# Patient Record
Sex: FEMALE | Race: WHITE
[De-identification: names, ages, dates, MRNs, and addresses within clinical notes are randomized per-mention and may not be internally consistent; named-entity substitution may affect disease eponyms.]

---

## 2017-01-09 NOTE — WOMENS IMAGING REPORT
EXAM DESCRIPTION:  RIGHT SCREENING MAMMO W/CAD



COMPLETED DATE/TIME:  1/9/2017 11:13 am



REASON FOR STUDY:  RIGHT SCREENING MAMMO Z12.31  ENCNTR SCREEN MAMMOGRAM FOR MALIGNANT NEOPLASM OF BR
E



COMPARISON:  Multiple since 2012



TECHNIQUE:  Standard craniocaudal and mediolateral oblique views of the right breast recorded using d
igital acquisition.

Patient is post left mastectomy in 2012



LIMITATIONS:  None.



FINDINGS:  BREAST: Right

Findings present which are benign by mammographic criteria. No suspicious masses, calcifications or a
rchitectural distortion.  Benign calcification in the right breast 12 o'clock position

Read with the assistance of CAD.

.[facility] - R2 Cenova Version 1.3

.[facility] - R2 Cenova Version 1.3

.[facility] - R2 Cenova Version 2.4

.[facility] - R2 Cenova Version 2.4

.[facility] - R2  Version 9.2

Benign mammographic findings may include one or more of the following:  Smooth masses, popcorn/rim/co
arse calcifications, asymmetries, post-procedure changes, and lesions with long-standing stability.



BREAST DENSITY:  b. There are scattered areas of fibroglandular density.



BIRAD:  2 BENIGN FINDING(S)



RECOMMENDATION:  RECOMMENDATION:  ROUTINE SCREENING.



COMMENT:  PATIENT NOTIFIED BY LETTER.

The American College of Radiology recommends an annual screening mammogram for women aged 40 years or
 over.  Each patient will receive a reminder prior to the anniversary date of her mammogram.

The American College of Radiology (ACR) has developed recommendations for screening MRI of the breast
s in certain patient populations, to be used in conjunction with mammography. Breast MRI surveillance
 may be appropriate for women with more than 20% lifetime risk of developing breast cancer as determi
tere by genetic testing, significant family history of the disease, or history of mantle radiation for
 Hodgkins Disease. ACR Practice Guidelines 2008.



TECHNICAL DOCUMENTATION:  FINDING NUMBER: (1)

ASSESSMENT: (1)

JOB ID:  [id number]

 2011 [employer]- All Rights Reserved

## 2018-02-21 ENCOUNTER — HOSPITAL ENCOUNTER (OUTPATIENT)
Dept: HOSPITAL 62 - WI | Age: 73
End: 2018-02-21
Attending: INTERNAL MEDICINE
Payer: MEDICARE

## 2018-02-21 DIAGNOSIS — Z12.31: Primary | ICD-10-CM

## 2018-02-22 NOTE — WOMENS IMAGING REPORT
EXAM DESCRIPTION:  3D SCREENING MAMMO RIGHT



COMPLETED DATE/TIME:  2/21/2018 3:55 pm



REASON FOR STUDY:  ROUTINE SCREENING; Z12.31 Z12.31  ENCNTR SCREEN MAMMOGRAM FOR MALIGNANT NEOPLASM O
F ROBBI



COMPARISON:  2013 to 2017



TECHNIQUE:  Standard craniocaudal and mediolateral oblique views of the breast recorded using digital
 acquisition and breast tomosynthesis.



LIMITATIONS:  None.



FINDINGS:  BREAST: right

No masses, calcifications or architectural distortion. No areas of suspicion.

Read with the assistance of CAD.

.[facility] - R2 Cenova Version 1.3

.[facility] - R2 Cenova Version 1.3

.[facility] - R2 Cenova Version 2.4

.[facility] [place] [facility] - R2 Cenova Version 2.4

.Counts include 234 beds at the [facility] - R2  Version 9.2



IMPRESSION:  NORMAL MAMMOGRAM.  BIRADS 1.



BREAST DENSITY:  b. There are scattered areas of fibroglandular density.



BIRAD:  1 Negative



RECOMMENDATION:  RECOMMENDATION:  ROUTINE SCREENING.



COMMENT:  The patient has been notified of the results by letter per SA requirements. Additional no
tification policies are in place for contacting patient with suspicious or incomplete findings.

Quality ID #[id number]: The American College of Radiology recommends an annual screening mammogram for women
 aged 40 years or over. This facility utilizes a reminder system to ensure that all patients receive 
reminder letters, and/or direct phone calls for appointments. This includes reminders for routine scr
eening mammograms, diagnostic mammograms, or other Breast Imaging Interventions when appropriate.  Th
is patient will be placed in the appropriate reminder system.

The American College of Radiology (ACR) has developed recommendations for screening MRI of the breast
s in certain patient populations, to be used in conjunction with mammography. Breast MRI surveillance
 may be appropriate for women with more than 20% lifetime risk of developing breast cancer as determi
tere by genetic testing, significant family history of the disease, or history of mantle radiation for
 Hodgkins Disease. ACR Practice Guidelines 2008.

[employer]



PQRS 6045F: Fluoroscopic imaging is not utilized for breast tomosynthesis.



TECHNICAL DOCUMENTATION:  FINDING NUMBER: (1)

ASSESSMENT: (1)

JOB ID:  [id number]

 2011 [employer]- All Rights Reserved

## 2019-02-21 ENCOUNTER — HOSPITAL ENCOUNTER (OUTPATIENT)
Dept: HOSPITAL 62 - WI | Age: 74
End: 2019-02-21
Attending: INTERNAL MEDICINE
Payer: MEDICARE

## 2019-02-21 DIAGNOSIS — Z12.31: Primary | ICD-10-CM

## 2019-02-21 DIAGNOSIS — C50.912: ICD-10-CM

## 2019-02-21 NOTE — WOMENS IMAGING REPORT
EXAM DESCRIPTION:  3D SCREENING MAMMO RIGHT



COMPLETED DATE/TIME:  2/21/2019 2:54 pm



REASON FOR STUDY:  ROUTINE 3D BILATERAL SCREENING,Z12.31 Z12.31  ENCNTR SCREEN MAMMOGRAM FOR MALIGNAN
T NEOPLASM OF ROBBI



COMPARISON:  Multiple since 2012



TECHNIQUE:  Standard craniocaudal and mediolateral oblique views of the right breast recorded using d
igital acquisition and breast tomosynthesis.

Post left mastectomy in 2012



LIMITATIONS:  None.



FINDINGS:  BREAST: right

No masses, worrisome calcifications or architectural distortion. No areas of suspicion.

Read with the assistance of CAD.

.[facility] - R2 Cenova Version 1.3

.[facility] - R2 Cenova Version 2.1

.\[facility]"" [facility] - R2 Cenova Version 2.4

.[facility] - R2 Cenova Version 2.4

.[facility] - R2  Version 9.2



IMPRESSION:  NORMAL MAMMOGRAM.  BIRADS 1.



BREAST DENSITY:  b. There are scattered areas of fibroglandular density.



BIRAD:  1 Negative



RECOMMENDATION:  RECOMMENDATION:  ROUTINE SCREENING.



COMMENT:  The patient has been notified of the results by letter per SA requirements. Additional no
tification policies are in place for contacting patient with suspicious or incomplete findings.

Quality ID #[id number]: The American College of Radiology recommends an annual screening mammogram for women
 aged 40 years or over. This facility utilizes a reminder system to ensure that all patients receive 
reminder letters, and/or direct phone calls for appointments. This includes reminders for routine scr
eening mammograms, diagnostic mammograms, or other Breast Imaging Interventions when appropriate.  Th
is patient will be placed in the appropriate reminder system.

The American College of Radiology (ACR) has developed recommendations for screening MRI of the breast
s in certain patient populations, to be used in conjunction with mammography. Breast MRI surveillance
 may be appropriate for women with more than 20% lifetime risk of developing breast cancer as determi
tere by genetic testing, significant family history of the disease, or history of mantle radiation for
 Hodgkins Disease. ACR Practice Guidelines 2008.

[employer]



PQRS 6045F: Fluoroscopic imaging is not utilized for breast tomosynthesis.



TECHNICAL DOCUMENTATION:  FINDING NUMBER: (1)

ASSESSMENT: (1)

JOB ID:  [id number]

 2011 [employer]- All Rights Reserved



Reading location - IP/workstation name: [email or web address]